# Patient Record
Sex: FEMALE | URBAN - METROPOLITAN AREA
[De-identification: names, ages, dates, MRNs, and addresses within clinical notes are randomized per-mention and may not be internally consistent; named-entity substitution may affect disease eponyms.]

---

## 2018-04-21 ENCOUNTER — APPOINTMENT (OUTPATIENT)
Dept: URBAN - METROPOLITAN AREA CLINIC 237 | Age: 43
Setting detail: DERMATOLOGY
End: 2018-04-21

## 2018-04-21 DIAGNOSIS — L65.8 OTHER SPECIFIED NONSCARRING HAIR LOSS: ICD-10-CM

## 2018-04-21 DIAGNOSIS — L70.0 ACNE VULGARIS: ICD-10-CM

## 2018-04-21 DIAGNOSIS — L72.8 OTHER FOLLICULAR CYSTS OF THE SKIN AND SUBCUTANEOUS TISSUE: ICD-10-CM

## 2018-04-21 DIAGNOSIS — L81.0 POSTINFLAMMATORY HYPERPIGMENTATION: ICD-10-CM

## 2018-04-21 PROCEDURE — OTHER PATIENT SPECIFIC COUNSELING: OTHER

## 2018-04-21 PROCEDURE — OTHER PRESCRIPTION: OTHER

## 2018-04-21 PROCEDURE — 99202 OFFICE O/P NEW SF 15 MIN: CPT | Mod: 25

## 2018-04-21 PROCEDURE — OTHER TREATMENT REGIMEN: OTHER

## 2018-04-21 PROCEDURE — OTHER COUNSELING: OTHER

## 2018-04-21 PROCEDURE — 11900 INJECT SKIN LESIONS </W 7: CPT

## 2018-04-21 PROCEDURE — OTHER INTRALESIONAL KENALOG: OTHER

## 2018-04-21 RX ORDER — HYDROQUINONE 4 %
CREAM (GRAM) TOPICAL QHS
Qty: 1 | Refills: 0 | Status: ERX | COMMUNITY
Start: 2018-04-21

## 2018-04-21 RX ORDER — CLINDAMYCIN PHOS/BENZOYL PEROX 1 %-5 %
SM AMT GEL (GRAM) TOPICAL QD
Qty: 1 | Refills: 0 | Status: ERX | COMMUNITY
Start: 2018-04-21

## 2018-04-21 ASSESSMENT — LOCATION DETAILED DESCRIPTION DERM
LOCATION DETAILED: RIGHT LATERAL FRONTAL SCALP
LOCATION DETAILED: LEFT INFERIOR CENTRAL MALAR CHEEK
LOCATION DETAILED: RIGHT INFERIOR CENTRAL MALAR CHEEK
LOCATION DETAILED: LEFT CENTRAL FRONTAL SCALP

## 2018-04-21 ASSESSMENT — LOCATION ZONE DERM
LOCATION ZONE: FACE
LOCATION ZONE: SCALP

## 2018-04-21 ASSESSMENT — LOCATION SIMPLE DESCRIPTION DERM
LOCATION SIMPLE: LEFT SCALP
LOCATION SIMPLE: RIGHT CHEEK
LOCATION SIMPLE: RIGHT SCALP
LOCATION SIMPLE: LEFT CHEEK

## 2018-04-21 NOTE — PROCEDURE: PATIENT SPECIFIC COUNSELING
Discussed ILS to inflammatory lesion on L cheek to decrease chance of PIH. If pt gets any more inflammatory acne lesion in future, she can call the office anytime for injections.

## 2018-04-21 NOTE — PROCEDURE: PATIENT SPECIFIC COUNSELING
Advised Onexton may be difficult to get covered or could be $$ (through [employer] it could cost $40 or $100-depending on insurance). Discussed BzCl as a very similar topical that would be more affordable. Pt open to trying BzCl. Will send through [employer]. Advised Onexton may be difficult to get covered or could be $$ (through [employer] it could cost $40 or $100-depending on insurance). Discussed BzCl as a very similar topical that would be more affordable. Pt open to trying BzCl. Will send through [employer].

## 2018-04-21 NOTE — PROCEDURE: INTRALESIONAL KENALOG
Administered By (Optional): MD
Total Volume Injected (Ccs- Only Use Numbers And Decimals): 0.03
Medical Necessity Clause: This procedure was medically necessary because the lesions that were treated were:
Concentration Of Solution Injected (Mg/Ml): 2.5
Include Z78.9 (Other Specified Conditions Influencing Health Status) As An Associated Diagnosis?: No
X Size Of Lesion In Cm (Optional): 0
Detail Level: Detailed
Kenalog Preparation: Kenalog

## 2018-04-21 NOTE — PROCEDURE: PATIENT SPECIFIC COUNSELING
Advised pt to try to keep hair loose as much as possible, as this condition is caused by pulling hair too tightly. Discussed Rogaine to help promote hair growth and discussed men’s vs female’s formula. Pt would prefer to try female 5% foam as a convenience factor. Advised Rogaine will most likely take months before any noticeable difference. MD recommends a good protein condition to help soften and coat hair: sample of Free & Clear conditioner given.

## 2018-04-21 NOTE — PROCEDURE: TREATMENT REGIMEN
Detail Level: Simple
Initiate Treatment: HQ 4% qhs
Initiate Treatment: Rogaine Female formula qd
Initiate Treatment: BzCl qd-bid prn for spot tx
Samples Given: Free and Clear conditioner qd

## 2018-04-21 NOTE — PROCEDURE: PATIENT SPECIFIC COUNSELING
Discussed HQ 2% vs 4% to help lightened darkened pigment left over from acne. Pt would prefer to try 4%. MD recommends pt to only apply to darkened areas, and not to surrounding skin as this could cause more unevenness in skin tone. If Rx 4% is too expensive, pt can come here to buy ours.

## 2018-05-01 ENCOUNTER — APPOINTMENT (OUTPATIENT)
Dept: URBAN - METROPOLITAN AREA CLINIC 237 | Age: 43
Setting detail: DERMATOLOGY
End: 2018-05-01

## 2018-05-01 DIAGNOSIS — L72.8 OTHER FOLLICULAR CYSTS OF THE SKIN AND SUBCUTANEOUS TISSUE: ICD-10-CM

## 2018-05-01 PROCEDURE — OTHER INTRALESIONAL KENALOG: OTHER

## 2018-05-01 PROCEDURE — 11900 INJECT SKIN LESIONS </W 7: CPT

## 2018-05-01 ASSESSMENT — LOCATION ZONE DERM
LOCATION ZONE: LIP
LOCATION ZONE: FACE

## 2018-05-01 ASSESSMENT — LOCATION DETAILED DESCRIPTION DERM
LOCATION DETAILED: LEFT INFERIOR NASAL CHEEK
LOCATION DETAILED: PHILTRUM

## 2018-05-01 ASSESSMENT — LOCATION SIMPLE DESCRIPTION DERM
LOCATION SIMPLE: LEFT CHEEK
LOCATION SIMPLE: UPPER LIP

## 2018-11-12 ENCOUNTER — APPOINTMENT (OUTPATIENT)
Dept: URBAN - METROPOLITAN AREA CLINIC 237 | Age: 43
Setting detail: DERMATOLOGY
End: 2018-11-13

## 2018-11-12 DIAGNOSIS — L65.8 OTHER SPECIFIED NONSCARRING HAIR LOSS: ICD-10-CM

## 2018-11-12 DIAGNOSIS — L70.0 ACNE VULGARIS: ICD-10-CM

## 2018-11-12 PROCEDURE — OTHER PRESCRIPTION: OTHER

## 2018-11-12 PROCEDURE — 99213 OFFICE O/P EST LOW 20 MIN: CPT

## 2018-11-12 PROCEDURE — OTHER REFER TO BODY DIAGRAM: OTHER

## 2018-11-12 PROCEDURE — OTHER TREATMENT REGIMEN: OTHER

## 2018-11-12 PROCEDURE — OTHER MIPS QUALITY: OTHER

## 2018-11-12 PROCEDURE — OTHER PATIENT SPECIFIC COUNSELING: OTHER

## 2018-11-12 RX ORDER — BETAMETHASONE VALERATE 1 MG/ML
LOTION CUTANEOUS QD
Qty: 1 | Refills: 0 | Status: ERX | COMMUNITY
Start: 2018-11-12

## 2018-11-12 RX ORDER — CLINDAMYCIN PHOS/BENZOYL PEROX 1 %-5 %
SM AMT GEL (GRAM) TOPICAL QD
Qty: 1 | Refills: 0 | Status: ERX

## 2018-11-12 ASSESSMENT — LOCATION SIMPLE DESCRIPTION DERM
LOCATION SIMPLE: RIGHT CHEEK
LOCATION SIMPLE: LEFT CHEEK
LOCATION SIMPLE: FRONTAL SCALP

## 2018-11-12 ASSESSMENT — LOCATION DETAILED DESCRIPTION DERM
LOCATION DETAILED: MEDIAL FRONTAL SCALP
LOCATION DETAILED: RIGHT INFERIOR CENTRAL MALAR CHEEK
LOCATION DETAILED: LEFT INFERIOR MEDIAL MALAR CHEEK

## 2018-11-12 ASSESSMENT — LOCATION ZONE DERM
LOCATION ZONE: FACE
LOCATION ZONE: SCALP

## 2018-11-12 NOTE — PROCEDURE: PATIENT SPECIFIC COUNSELING
MD recommends topical steroid tx instead of ILS re: the extent of area and less severe than many. Discussed hair styles and advised against tight robert; pt has been wearing hair naturally. \\n\\nDiscussed applying Rogaine and betamethasone valerate separately; pt can decide which she applies qam and which she applies qhs.
Detail Level: Zone

## 2018-11-12 NOTE — PROCEDURE: TREATMENT REGIMEN
Continue Regimen: Rogaine Female 5% formula qd
Initiate Treatment: betamethasone valerate 0.1 % lotion qd
Detail Level: Simple
Detail Level: Zone
Continue Regimen: BzCl qd-bid prn for spot tx